# Patient Record
Sex: FEMALE | Race: OTHER | NOT HISPANIC OR LATINO
[De-identification: names, ages, dates, MRNs, and addresses within clinical notes are randomized per-mention and may not be internally consistent; named-entity substitution may affect disease eponyms.]

---

## 2017-03-17 ENCOUNTER — TRANSCRIPTION ENCOUNTER (OUTPATIENT)
Age: 57
End: 2017-03-17

## 2017-10-16 ENCOUNTER — APPOINTMENT (OUTPATIENT)
Dept: OBGYN | Facility: CLINIC | Age: 57
End: 2017-10-16
Payer: COMMERCIAL

## 2017-10-16 VITALS
WEIGHT: 169 LBS | DIASTOLIC BLOOD PRESSURE: 60 MMHG | BODY MASS INDEX: 28.85 KG/M2 | SYSTOLIC BLOOD PRESSURE: 100 MMHG | HEIGHT: 64 IN

## 2017-10-16 PROCEDURE — 99396 PREV VISIT EST AGE 40-64: CPT

## 2017-10-19 LAB — CYTOLOGY CVX/VAG DOC THIN PREP: NORMAL

## 2018-10-22 ENCOUNTER — APPOINTMENT (OUTPATIENT)
Dept: OBGYN | Facility: CLINIC | Age: 58
End: 2018-10-22
Payer: COMMERCIAL

## 2018-10-22 VITALS
BODY MASS INDEX: 29.02 KG/M2 | WEIGHT: 170 LBS | DIASTOLIC BLOOD PRESSURE: 70 MMHG | SYSTOLIC BLOOD PRESSURE: 108 MMHG | HEIGHT: 64 IN

## 2018-10-22 DIAGNOSIS — R92.1 MAMMOGRAPHIC CALCIFICATION FOUND ON DIAGNOSTIC IMAGING OF BREAST: ICD-10-CM

## 2018-10-22 PROCEDURE — 99396 PREV VISIT EST AGE 40-64: CPT

## 2018-11-26 LAB — CYTOLOGY CVX/VAG DOC THIN PREP: NORMAL

## 2019-12-02 ENCOUNTER — APPOINTMENT (OUTPATIENT)
Dept: OBGYN | Facility: CLINIC | Age: 59
End: 2019-12-02

## 2020-01-02 ENCOUNTER — APPOINTMENT (OUTPATIENT)
Dept: OBGYN | Facility: CLINIC | Age: 60
End: 2020-01-02

## 2020-07-01 ENCOUNTER — APPOINTMENT (OUTPATIENT)
Dept: OBGYN | Facility: CLINIC | Age: 60
End: 2020-07-01
Payer: COMMERCIAL

## 2020-07-01 VITALS
HEIGHT: 64 IN | WEIGHT: 168 LBS | BODY MASS INDEX: 28.68 KG/M2 | SYSTOLIC BLOOD PRESSURE: 114 MMHG | DIASTOLIC BLOOD PRESSURE: 80 MMHG

## 2020-07-01 DIAGNOSIS — Z01.419 ENCOUNTER FOR GYNECOLOGICAL EXAMINATION (GENERAL) (ROUTINE) W/OUT ABNORMAL FINDINGS: ICD-10-CM

## 2020-07-01 PROCEDURE — 99396 PREV VISIT EST AGE 40-64: CPT

## 2020-07-01 NOTE — HISTORY OF PRESENT ILLNESS
[Last Mammogram ___] : Last Mammogram was [unfilled] [Postmenopausal] : is postmenopausal [Last Pap ___] : Last cervical pap smear was [unfilled]

## 2020-07-01 NOTE — PHYSICAL EXAM
[Awake] : awake [Acute Distress] : no acute distress [Alert] : alert [LAD] : no lymphadenopathy [Thyroid Nodule] : no thyroid nodule [Goiter] : no goiter [Mass] : no breast mass [Nipple Discharge] : no nipple discharge [Axillary LAD] : no axillary lymphadenopathy [Soft] : soft [Tender] : non tender [Oriented x3] : oriented to person, place, and time [Normal] : uterus [No Bleeding] : there was no active vaginal bleeding [Uterine Adnexae] : were not tender and not enlarged [RRR, No Murmurs] : RRR, no murmurs [CTAB] : CTAB

## 2020-07-08 LAB — CYTOLOGY CVX/VAG DOC THIN PREP: ABNORMAL

## 2021-07-12 ENCOUNTER — APPOINTMENT (OUTPATIENT)
Dept: OBGYN | Facility: CLINIC | Age: 61
End: 2021-07-12
Payer: COMMERCIAL

## 2021-07-12 VITALS — HEIGHT: 64 IN | SYSTOLIC BLOOD PRESSURE: 112 MMHG | DIASTOLIC BLOOD PRESSURE: 70 MMHG

## 2021-07-12 DIAGNOSIS — Z01.419 ENCOUNTER FOR GYNECOLOGICAL EXAMINATION (GENERAL) (ROUTINE) W/OUT ABNORMAL FINDINGS: ICD-10-CM

## 2021-07-12 PROCEDURE — 99072 ADDL SUPL MATRL&STAF TM PHE: CPT

## 2021-07-12 PROCEDURE — 99396 PREV VISIT EST AGE 40-64: CPT

## 2021-07-12 NOTE — HISTORY OF PRESENT ILLNESS
[FreeTextEntry1] : patient presents today for routine annual exam.\par  [postmenopausal] : postmenopausal [TextBox_4] : felt something in left axilla  [Mammogramdate] : 12/2020 [BreastSonogramDate] : 12/2020 [PapSmeardate] : 7/2020

## 2021-07-16 ENCOUNTER — NON-APPOINTMENT (OUTPATIENT)
Age: 61
End: 2021-07-16

## 2021-07-19 LAB — CYTOLOGY CVX/VAG DOC THIN PREP: ABNORMAL

## 2021-09-23 ENCOUNTER — APPOINTMENT (OUTPATIENT)
Dept: OBGYN | Facility: CLINIC | Age: 61
End: 2021-09-23
Payer: COMMERCIAL

## 2021-09-23 VITALS
WEIGHT: 165 LBS | HEIGHT: 64 IN | SYSTOLIC BLOOD PRESSURE: 102 MMHG | BODY MASS INDEX: 28.17 KG/M2 | DIASTOLIC BLOOD PRESSURE: 60 MMHG

## 2021-09-23 DIAGNOSIS — N63.0 UNSPECIFIED LUMP IN UNSPECIFIED BREAST: ICD-10-CM

## 2021-09-23 DIAGNOSIS — N64.4 MASTODYNIA: ICD-10-CM

## 2021-09-23 PROCEDURE — 99213 OFFICE O/P EST LOW 20 MIN: CPT

## 2022-04-05 ENCOUNTER — APPOINTMENT (OUTPATIENT)
Dept: DERMATOLOGY | Facility: CLINIC | Age: 62
End: 2022-04-05
Payer: COMMERCIAL

## 2022-04-05 DIAGNOSIS — D22.9 MELANOCYTIC NEVI, UNSPECIFIED: ICD-10-CM

## 2022-04-05 DIAGNOSIS — R23.8 OTHER SKIN CHANGES: ICD-10-CM

## 2022-04-05 DIAGNOSIS — Z12.83 ENCOUNTER FOR SCREENING FOR MALIGNANT NEOPLASM OF SKIN: ICD-10-CM

## 2022-04-05 DIAGNOSIS — D18.01 HEMANGIOMA OF SKIN AND SUBCUTANEOUS TISSUE: ICD-10-CM

## 2022-04-05 PROCEDURE — 99203 OFFICE O/P NEW LOW 30 MIN: CPT | Mod: 25,GC

## 2022-04-05 PROCEDURE — 17110 DESTRUCTION B9 LES UP TO 14: CPT | Mod: GC

## 2022-05-03 ENCOUNTER — APPOINTMENT (OUTPATIENT)
Dept: SURGERY | Facility: CLINIC | Age: 62
End: 2022-05-03
Payer: COMMERCIAL

## 2022-05-03 VITALS
HEART RATE: 49 BPM | WEIGHT: 165 LBS | HEIGHT: 64 IN | BODY MASS INDEX: 28.17 KG/M2 | SYSTOLIC BLOOD PRESSURE: 113 MMHG | DIASTOLIC BLOOD PRESSURE: 74 MMHG

## 2022-05-03 DIAGNOSIS — D64.9 ANEMIA, UNSPECIFIED: ICD-10-CM

## 2022-05-03 DIAGNOSIS — Z78.0 ASYMPTOMATIC MENOPAUSAL STATE: ICD-10-CM

## 2022-05-03 PROCEDURE — 36415 COLL VENOUS BLD VENIPUNCTURE: CPT

## 2022-05-03 PROCEDURE — 99204 OFFICE O/P NEW MOD 45 MIN: CPT

## 2022-05-05 LAB
24R-OH-CALCIDIOL SERPL-MCNC: 44.9 PG/ML
CALCIUM SERPL-MCNC: 9 MG/DL
CALCIUM SERPL-MCNC: 9 MG/DL
PARATHYROID HORMONE INTACT: 59 PG/ML
T3 SERPL-MCNC: 100 NG/DL
T4 FREE SERPL-MCNC: 1.2 NG/DL
THYROGLOB AB SERPL-ACNC: <20 IU/ML
THYROPEROXIDASE AB SERPL IA-ACNC: 31.7 IU/ML
TSH SERPL-ACNC: 2.08 UIU/ML

## 2022-05-08 PROBLEM — Z78.0 MENOPAUSE: Status: ACTIVE | Noted: 2022-05-08

## 2022-05-08 PROBLEM — D64.9 ANEMIA: Status: ACTIVE | Noted: 2022-05-08

## 2022-05-08 NOTE — CONSULT LETTER
[Dear  ___] : Dear  [unfilled], [Consult Letter:] : I had the pleasure of evaluating your patient, [unfilled]. [Please see my note below.] : Please see my note below. [Consult Closing:] : Thank you very much for allowing me to participate in the care of this patient.  If you have any questions, please do not hesitate to contact me. [Sincerely,] : Sincerely, [FreeTextEntry2] : Dr. Nicholas Khan, Dr. Elder Ferris, Dr. Doe Us, Dr. Jalen Augustin [FreeTextEntry3] : Giovanny Croft MD, FACS\par System Director, Endocrine Surgery\par [facility]\par Associate  Professor of Surgery\par [employer] [facility]  [DrSahara  ___] : Dr. GOINS [DrSahara ___] : Dr. GOINS

## 2022-05-08 NOTE — ASSESSMENT
[FreeTextEntry1] : bloods drawn.  requested 24 hour urine collection for calcium. to call next week for results. patient has been given the opportunity to ask questions, and all of the patient's questions have been answered to their satisfaction\par

## 2022-05-08 NOTE — PHYSICAL EXAM
[de-identified] : no palpable thyroid nodules [Laryngoscopy Performed] : laryngoscopy was performed, see procedure section for findings [Midline] : located in midline position [Normal] : orientation to person, place, and time: normal [de-identified] : indirect  laryngoscopy shows normal vocal cord mobility bilaterally with no lesions noted

## 2022-05-08 NOTE — HISTORY OF PRESENT ILLNESS
[de-identified] : history of kidney stones and mildly elevated PTH. denies bone or joint pain, constipation, dysphagia, hoarseness, SOB or RT exposure.  calcium 9.3, PTH 89, TSH 1.86.  CT shows right kidney stone. I have reviewed all old and new data and available images.\par

## 2022-05-08 NOTE — REASON FOR VISIT
[Initial Consultation] : an initial consultation for [FreeTextEntry2] : elevated parathyroid hormone [Other: _____] : [unfilled]

## 2022-05-12 ENCOUNTER — TRANSCRIPTION ENCOUNTER (OUTPATIENT)
Age: 62
End: 2022-05-12

## 2022-05-12 LAB
CAU: 10 MG/DL
CREAT 24H UR-MCNC: 1.1 G/24 H
CREAT ?TM UR-MCNC: 47 MG/DL
PROT ?TM UR-MCNC: 24 HR
SPECIMEN VOL 24H UR: 240 MG/24 H
SPECIMEN VOL 24H UR: 2400 ML

## 2022-05-13 ENCOUNTER — NON-APPOINTMENT (OUTPATIENT)
Age: 62
End: 2022-05-13

## 2022-11-15 ENCOUNTER — APPOINTMENT (OUTPATIENT)
Dept: SURGERY | Facility: CLINIC | Age: 62
End: 2022-11-15

## 2022-11-15 DIAGNOSIS — Z00.00 ENCOUNTER FOR GENERAL ADULT MEDICAL EXAMINATION W/OUT ABNORMAL FINDINGS: ICD-10-CM

## 2022-11-15 PROCEDURE — 36415 COLL VENOUS BLD VENIPUNCTURE: CPT

## 2022-11-15 PROCEDURE — 99213 OFFICE O/P EST LOW 20 MIN: CPT

## 2022-11-15 NOTE — ASSESSMENT
[FreeTextEntry1] : bloods drawn. doubtful is normocalcemic hyperparathyroidism.  to call next week for results. patient has been given the opportunity to ask questions, and all of the patient's questions have been answered to their satisfaction.  to return earlier if any change. \par

## 2022-11-15 NOTE — HISTORY OF PRESENT ILLNESS
[de-identified] : prior evaluation of elevated PTH level and kidney stones. f/u bloods showed low calcium, normal PTH and elevated urinary calcium. no changes medically since last visit. I have reviewed all old and new data and available images.\par

## 2022-11-15 NOTE — PHYSICAL EXAM
[de-identified] : no palpable thyroid nodules [Laryngoscopy Performed] : laryngoscopy was performed, see procedure section for findings [Midline] : located in midline position [Normal] : orientation to person, place, and time: normal

## 2022-11-17 LAB
24R-OH-CALCIDIOL SERPL-MCNC: 49.7 PG/ML
25(OH)D3 SERPL-MCNC: 23.9 NG/ML
CALCIUM SERPL-MCNC: 9.3 MG/DL
CALCIUM SERPL-MCNC: 9.3 MG/DL
PARATHYROID HORMONE INTACT: 45 PG/ML

## 2022-11-21 ENCOUNTER — NON-APPOINTMENT (OUTPATIENT)
Age: 62
End: 2022-11-21

## 2023-08-09 ENCOUNTER — APPOINTMENT (OUTPATIENT)
Dept: OBGYN | Facility: CLINIC | Age: 63
End: 2023-08-09
Payer: COMMERCIAL

## 2023-08-09 VITALS
HEIGHT: 64 IN | TEMPERATURE: 97.5 F | DIASTOLIC BLOOD PRESSURE: 71 MMHG | WEIGHT: 165 LBS | OXYGEN SATURATION: 97 % | HEART RATE: 72 BPM | BODY MASS INDEX: 28.17 KG/M2 | SYSTOLIC BLOOD PRESSURE: 113 MMHG | RESPIRATION RATE: 18 BRPM

## 2023-08-09 DIAGNOSIS — R92.2 INCONCLUSIVE MAMMOGRAM: ICD-10-CM

## 2023-08-09 DIAGNOSIS — Z01.419 ENCOUNTER FOR GYNECOLOGICAL EXAMINATION (GENERAL) (ROUTINE) W/OUT ABNORMAL FINDINGS: ICD-10-CM

## 2023-08-09 PROCEDURE — 99396 PREV VISIT EST AGE 40-64: CPT

## 2023-08-10 NOTE — HISTORY OF PRESENT ILLNESS
[FreeTextEntry1] : 63yo P2 LMP 10yrs ago here for annual gyn exam. pap- 1yr ago, wnl  mammo- 9/2021, breast bx benign. f/up birad 2 colonoscopy- 2yrs ago, wnl.   sees pcp regularly.  lives with  and 2 children.  works for [employer]

## 2023-08-11 LAB — HPV HIGH+LOW RISK DNA PNL CVX: NOT DETECTED

## 2023-08-14 LAB — CYTOLOGY CVX/VAG DOC THIN PREP: ABNORMAL

## 2023-11-02 ENCOUNTER — APPOINTMENT (OUTPATIENT)
Dept: SURGERY | Facility: CLINIC | Age: 63
End: 2023-11-02
Payer: COMMERCIAL

## 2023-11-02 PROCEDURE — 99213 OFFICE O/P EST LOW 20 MIN: CPT

## 2023-11-07 LAB — CALCIUM SERPL-MCNC: 9.2 MG/DL

## 2023-11-08 LAB
25(OH)D3 SERPL-MCNC: 18.6 NG/ML
CALCIUM SERPL-MCNC: 9.2 MG/DL
PARATHYROID HORMONE INTACT: 51 PG/ML
T3 SERPL-MCNC: 98 NG/DL
T4 FREE SERPL-MCNC: 1.2 NG/DL
THYROGLOB AB SERPL-ACNC: <1 IU/ML
THYROPEROXIDASE AB SERPL IA-ACNC: 10 IU/ML
TSH SERPL-ACNC: 1.63 UIU/ML

## 2023-12-11 ENCOUNTER — APPOINTMENT (OUTPATIENT)
Dept: OPHTHALMOLOGY | Facility: CLINIC | Age: 63
End: 2023-12-11
Payer: COMMERCIAL

## 2023-12-11 ENCOUNTER — NON-APPOINTMENT (OUTPATIENT)
Age: 63
End: 2023-12-11

## 2023-12-11 PROCEDURE — 92004 COMPRE OPH EXAM NEW PT 1/>: CPT

## 2023-12-26 ENCOUNTER — OUTPATIENT (OUTPATIENT)
Dept: OUTPATIENT SERVICES | Facility: HOSPITAL | Age: 63
LOS: 1 days | End: 2023-12-26
Payer: COMMERCIAL

## 2023-12-26 ENCOUNTER — APPOINTMENT (OUTPATIENT)
Dept: ULTRASOUND IMAGING | Facility: IMAGING CENTER | Age: 63
End: 2023-12-26
Payer: COMMERCIAL

## 2023-12-26 DIAGNOSIS — R79.89 OTHER SPECIFIED ABNORMAL FINDINGS OF BLOOD CHEMISTRY: ICD-10-CM

## 2023-12-26 DIAGNOSIS — Z98.89 OTHER SPECIFIED POSTPROCEDURAL STATES: Chronic | ICD-10-CM

## 2023-12-26 PROCEDURE — 76536 US EXAM OF HEAD AND NECK: CPT | Mod: 26

## 2023-12-26 PROCEDURE — 76536 US EXAM OF HEAD AND NECK: CPT

## 2024-01-04 DIAGNOSIS — R79.89 OTHER SPECIFIED ABNORMAL FINDINGS OF BLOOD CHEMISTRY: ICD-10-CM

## 2025-01-16 ENCOUNTER — APPOINTMENT (OUTPATIENT)
Dept: SURGERY | Facility: CLINIC | Age: 65
End: 2025-01-16

## 2025-01-16 DIAGNOSIS — E04.1 NONTOXIC SINGLE THYROID NODULE: ICD-10-CM

## 2025-01-16 DIAGNOSIS — R79.89 OTHER SPECIFIED ABNORMAL FINDINGS OF BLOOD CHEMISTRY: ICD-10-CM

## 2025-01-16 LAB
25(OH)D3 SERPL-MCNC: 51.6 NG/ML
CALCIUM SERPL-MCNC: 9.3 MG/DL
CALCIUM SERPL-MCNC: 9.3 MG/DL
PARATHYROID HORMONE INTACT: 35 PG/ML
T3 SERPL-MCNC: 104 NG/DL
T4 FREE SERPL-MCNC: 1.1 NG/DL
THYROGLOB AB SERPL-ACNC: 19.9 IU/ML
THYROPEROXIDASE AB SERPL IA-ACNC: 17.4 IU/ML
TSH SERPL-ACNC: 1.94 UIU/ML

## 2025-01-16 PROCEDURE — 99214 OFFICE O/P EST MOD 30 MIN: CPT

## 2025-01-16 PROCEDURE — 36415 COLL VENOUS BLD VENIPUNCTURE: CPT
